# Patient Record
Sex: FEMALE | RURAL
[De-identification: names, ages, dates, MRNs, and addresses within clinical notes are randomized per-mention and may not be internally consistent; named-entity substitution may affect disease eponyms.]

---

## 2021-02-24 ENCOUNTER — APPOINTMENT (OUTPATIENT)
Dept: RURAL CLINIC 10 | Age: 66
Setting detail: DERMATOLOGY
End: 2021-02-28

## 2021-02-24 DIAGNOSIS — L84 CORNS AND CALLOSITIES: ICD-10-CM

## 2021-02-24 DIAGNOSIS — L40.0 PSORIASIS VULGARIS: ICD-10-CM

## 2021-02-24 PROCEDURE — OTHER VENIPUNCTURE: OTHER

## 2021-02-24 PROCEDURE — OTHER COUNSELING: OTHER

## 2021-02-24 PROCEDURE — 99204 OFFICE O/P NEW MOD 45 MIN: CPT | Mod: 25

## 2021-02-24 PROCEDURE — OTHER ORDER TESTS: OTHER

## 2021-02-24 PROCEDURE — OTHER PRESCRIPTION: OTHER

## 2021-02-24 PROCEDURE — OTHER PRESCRIPTION MEDICATION MANAGEMENT: OTHER

## 2021-02-24 PROCEDURE — 36415 COLL VENOUS BLD VENIPUNCTURE: CPT

## 2021-02-24 RX ORDER — SECUKINUMAB 150 MG/ML
INJECTION SUBCUTANEOUS
Qty: 5 | Refills: 0 | Status: CANCELLED

## 2021-02-24 RX ORDER — SECUKINUMAB 150 MG/ML
INJECTION SUBCUTANEOUS
Qty: 1 | Refills: 5 | Status: CANCELLED | COMMUNITY
Start: 2021-02-24

## 2021-02-24 ASSESSMENT — LOCATION DETAILED DESCRIPTION DERM
LOCATION DETAILED: RIGHT PROXIMAL DORSAL FOREARM
LOCATION DETAILED: LEFT PROXIMAL DORSAL FOREARM
LOCATION DETAILED: RIGHT ANTERIOR DISTAL UPPER ARM
LOCATION DETAILED: RIGHT INSTEP
LOCATION DETAILED: LEFT INSTEP

## 2021-02-24 ASSESSMENT — LOCATION SIMPLE DESCRIPTION DERM
LOCATION SIMPLE: RIGHT PLANTAR SURFACE
LOCATION SIMPLE: RIGHT FOREARM
LOCATION SIMPLE: RIGHT UPPER ARM
LOCATION SIMPLE: LEFT PLANTAR SURFACE
LOCATION SIMPLE: LEFT FOREARM

## 2021-02-24 ASSESSMENT — LOCATION ZONE DERM
LOCATION ZONE: FEET
LOCATION ZONE: ARM

## 2021-02-24 NOTE — PROCEDURE: COUNSELING
Detail Level: Detailed
Detail Level: Zone
Patient Specific Counseling (Will Not Stick From Patient To Patient): Patient denies frequent infections, trouble healing, internal cancers

## 2021-02-24 NOTE — PROCEDURE: VENIPUNCTURE
Detail Level: Zone
Bill For Individual Tests Below?: no
Venipuncture Paragraph: An alcohol pad was applied to the venipuncture site. Venipuncture was performed using a butterfly needle. Pressure and a bandaid was applied to the site. No complications were noted.
Number Of Tubes Drawn: 3

## 2021-02-24 NOTE — PROCEDURE: PRESCRIPTION MEDICATION MANAGEMENT
Detail Level: Zone
Render In Strict Bullet Format?: No
Plan: Stop mtx once Cosentyx is initiated
Initiate Treatment: Cosentyx Pen 300 mg/2 Pens (150 mg/mL) subcutaneous \\nDays Supply: 30\\nSig: Inject 300mg subcutaneously on week 0, 1, 2, 3, and 4.\\n\\nCosentyx Pen 300 mg/2 Pens (150 mg/mL) subcutaneous \\nDays Supply: 30\\nSig: Inject 300mg subcutaneously q 4 weeks
Initiate Treatment: Cushions for feet, file them down, eucirin with urea

## 2021-03-10 ENCOUNTER — RX ONLY (RX ONLY)
Age: 66
End: 2021-03-10

## 2021-03-10 RX ORDER — TILDRAKIZUMAB-ASMN 100 MG/ML
INJECTION, SOLUTION SUBCUTANEOUS
Qty: 1 | Refills: 6 | Status: CANCELLED | COMMUNITY
Start: 2021-03-10

## 2021-04-06 ENCOUNTER — APPOINTMENT (OUTPATIENT)
Dept: RURAL CLINIC 10 | Age: 66
Setting detail: DERMATOLOGY
End: 2021-04-06

## 2021-04-06 DIAGNOSIS — L40.0 PSORIASIS VULGARIS: ICD-10-CM

## 2021-04-06 PROCEDURE — OTHER COUNSELING: OTHER

## 2021-04-06 PROCEDURE — OTHER TREATMENT REGIMEN: OTHER

## 2021-04-06 PROCEDURE — 99213 OFFICE O/P EST LOW 20 MIN: CPT

## 2021-04-06 ASSESSMENT — LOCATION DETAILED DESCRIPTION DERM
LOCATION DETAILED: LEFT PROXIMAL DORSAL FOREARM
LOCATION DETAILED: RIGHT PROXIMAL DORSAL FOREARM

## 2021-04-06 ASSESSMENT — LOCATION SIMPLE DESCRIPTION DERM
LOCATION SIMPLE: RIGHT FOREARM
LOCATION SIMPLE: LEFT FOREARM

## 2021-04-06 ASSESSMENT — LOCATION ZONE DERM: LOCATION ZONE: ARM

## 2021-04-06 NOTE — PROCEDURE: COUNSELING
Patient Specific Counseling (Will Not Stick From Patient To Patient): Patient denies frequent infections, trouble healing, internal cancers. She also states that psoriatic arthritis is more of a concern than plaques. Plaques are well controlled with diprolene cream. We discussed Cosentyx, Ilumya or TNF inhibitors. Patient wishes to not use any other medications at this time and decline referral to rheumatology due to the long drive. Patient sees PCP q 4 months for lab work due to mtx use. Requested patient have labs sent to our office.
Detail Level: Zone

## 2021-04-06 NOTE — PROCEDURE: TREATMENT REGIMEN
Continue Regimen: Methotrexate 2.5mg 3 tabs po once weekly
Detail Level: Zone
Plan: Pt refusing biologic injections, refusing referral to rheumatologist